# Patient Record
Sex: FEMALE | Race: WHITE | URBAN - METROPOLITAN AREA
[De-identification: names, ages, dates, MRNs, and addresses within clinical notes are randomized per-mention and may not be internally consistent; named-entity substitution may affect disease eponyms.]

---
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
## 2022-02-15 NOTE — TELEPHONE ENCOUNTER
---------------------  From: Brayan Hensley (Phone Messages Pool ([id number]_[facility]))   To: [address] Pool ([id number]_[facility]) ;     Sent: 10/11/2021 8:09:14 AM CDT  Subject: Order for depo shot     Pt called stating that she has faxed an order to get her depo shot here. She has made an appt, but wanted to see if RNs got the order.   They faxed it about 10 minutes ago.No fax has been received